# Patient Record
Sex: FEMALE | Race: OTHER | HISPANIC OR LATINO | URBAN - METROPOLITAN AREA
[De-identification: names, ages, dates, MRNs, and addresses within clinical notes are randomized per-mention and may not be internally consistent; named-entity substitution may affect disease eponyms.]

---
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
## 2019-06-05 NOTE — DISCHARGE NOTE OB - FUNCTIONAL STATUS DATE
Called patient about upcoming appointment Told patient to come 30 min. prior to scheduled appointment to complete paper work, bring photo ID and insurance cards, medications. 17-Jun-2018

## 2023-03-06 NOTE — DISCHARGE NOTE OB - CARE PLAN
Wound Care to Evaluate/Follow Up  Principal Discharge DX:	Postpartum state  Goal:	return to prepregnancy sate  Assessment and plan of treatment:	Take Motrin 600mg every 6 hours and/or tylenol 650mg every 6 hours as needed for pain. Call your OBGYN to schedule a follow up appointment in 4-6weeks. Call your OBGYN if you experiences severe abdominal pain not improved by oral pain medications, heavy bright red vaginal bleeding saturating more than 1 pad per hour, or fever greater than 100.4F. Schedule an appointment with OBGYN for IUD check within the next 2wks.

## 2024-11-18 NOTE — PATIENT PROFILE OB - FUNCTIONAL LEVEL PRIOR: TOILETING
Detail Level: Generalized Sunscreen Recommendations: Blue Lizard SPF 30 Detail Level: Simple (0) independent Detail Level: Detailed